# Patient Record
Sex: MALE | Race: WHITE | NOT HISPANIC OR LATINO | URBAN - NONMETROPOLITAN AREA
[De-identification: names, ages, dates, MRNs, and addresses within clinical notes are randomized per-mention and may not be internally consistent; named-entity substitution may affect disease eponyms.]

---

## 2019-08-27 ENCOUNTER — IMPORTED ENCOUNTER (OUTPATIENT)
Dept: URBAN - NONMETROPOLITAN AREA CLINIC 1 | Facility: CLINIC | Age: 54
End: 2019-08-27

## 2019-08-27 PROCEDURE — S0621 ROUTINE OPHTHALMOLOGICAL EXA: HCPCS

## 2019-08-27 NOTE — PATIENT DISCUSSION
IDDM x 9 yrs - no BDR. Stressed importance of good control. BF rx given. Prefers dvo.; 's Notes: Former  at [employer]. 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

## 2021-09-15 ENCOUNTER — IMPORTED ENCOUNTER (OUTPATIENT)
Dept: URBAN - NONMETROPOLITAN AREA CLINIC 1 | Facility: CLINIC | Age: 56
End: 2021-09-15

## 2021-09-15 PROCEDURE — 92310 CONTACT LENS FITTING OU: CPT

## 2021-09-15 PROCEDURE — 92014 COMPRE OPH EXAM EST PT 1/>: CPT

## 2021-09-15 PROCEDURE — 92015 DETERMINE REFRACTIVE STATE: CPT

## 2021-09-15 NOTE — PATIENT DISCUSSION
DM s -Stressed the importance of keeping blood sugars under control blood pressure under control and weight normalization and regular visits with PCP. -Explained the possible effects of poorly controlled diabetes and the damage that diabetes can cause to ocular health. -Patient to check HgbA1C.-Pt instructed to contact our office with any vision changes. Myopia-Discussed diagnosis with patient. -Explained that people who are myopic are at a higher risk for developing RD/RT and reviewed associated S&S.-Pt to contact our office if symptoms develop. Astigmatism-Discussed diagnosis with patient. Presbyopia-Discussed diagnosis with patient. Updated spec Rx given. Recommend lens that will provide comfort as well as protect safety and health of eyes. CL wear-CLs fit and center well.-Stressed that patient should not sleep in CL. -Updated CL Rx given. 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

## 2021-10-01 NOTE — PATIENT DISCUSSION
New Prescription: Maxitrol (neomycin-polymyxin-dexameth): drops,suspension: 3.5-10,000-0.1 mg/mL-unit/mL-% 1 drop twice a day as directed into left eye 10-

## 2021-10-01 NOTE — PATIENT DISCUSSION
- Benign appearance. Has symptoms reliably from approximately 9:00 every night to the early morning. Resolves shortly after waking. Unclear cause.

## 2022-04-15 ASSESSMENT — VISUAL ACUITY
OU_CC: 20/50
OS_CC: 20/20
OU_CC: 20/20
OS_SC: 20/20
OD_SC: 20/20
OD_CC: 20/50-1
OD_SC: 20/20
OS_SC: 20/20
OS_CC: 20/200
OD_CC: 20/20
OD_CC: 20/80-1
OS_CC: 20/50
OU_SC: 20/20

## 2022-04-15 ASSESSMENT — TONOMETRY
OS_IOP_MMHG: 15
OD_IOP_MMHG: 15
OD_IOP_MMHG: 15
OS_IOP_MMHG: 15

## 2022-10-17 ENCOUNTER — ESTABLISHED PATIENT (OUTPATIENT)
Dept: RURAL CLINIC 1 | Facility: CLINIC | Age: 57
End: 2022-10-17

## 2022-10-17 DIAGNOSIS — E11.9: ICD-10-CM

## 2022-10-17 DIAGNOSIS — H35.413: ICD-10-CM

## 2022-10-17 DIAGNOSIS — H52.13: ICD-10-CM

## 2022-10-17 PROCEDURE — 92014 COMPRE OPH EXAM EST PT 1/>: CPT

## 2022-10-17 PROCEDURE — 92015 DETERMINE REFRACTIVE STATE: CPT

## 2022-10-17 ASSESSMENT — VISUAL ACUITY
OD_CC: 20/20
OU_SC: 20/25
OS_CC: 20/20
OU_CC: 20/20-1

## 2022-10-17 ASSESSMENT — TONOMETRY
OD_IOP_MMHG: 17
OS_IOP_MMHG: 15

## 2023-10-18 ENCOUNTER — ESTABLISHED PATIENT (OUTPATIENT)
Dept: RURAL CLINIC 1 | Facility: CLINIC | Age: 58
End: 2023-10-18

## 2023-10-18 DIAGNOSIS — H52.223: ICD-10-CM

## 2023-10-18 DIAGNOSIS — H35.413: ICD-10-CM

## 2023-10-18 DIAGNOSIS — H52.13: ICD-10-CM

## 2023-10-18 DIAGNOSIS — E11.9: ICD-10-CM

## 2023-10-18 DIAGNOSIS — H52.4: ICD-10-CM

## 2023-10-18 PROCEDURE — 92015 DETERMINE REFRACTIVE STATE: CPT

## 2023-10-18 PROCEDURE — 92014 COMPRE OPH EXAM EST PT 1/>: CPT

## 2023-10-18 ASSESSMENT — VISUAL ACUITY
OS_CC: 20/25+1
OU_CC: 20/20
OD_CC: 20/20
OS_SC: 20/25
OD_SC: 20/25

## 2023-10-18 ASSESSMENT — TONOMETRY
OD_IOP_MMHG: 15
OS_IOP_MMHG: 15

## 2024-10-21 ENCOUNTER — COMPREHENSIVE EXAM (OUTPATIENT)
Dept: RURAL CLINIC 1 | Facility: CLINIC | Age: 59
End: 2024-10-21

## 2024-10-21 DIAGNOSIS — H52.223: ICD-10-CM

## 2024-10-21 DIAGNOSIS — E11.9: ICD-10-CM

## 2024-10-21 DIAGNOSIS — H52.4: ICD-10-CM

## 2024-10-21 DIAGNOSIS — H35.413: ICD-10-CM

## 2024-10-21 DIAGNOSIS — H52.13: ICD-10-CM

## 2024-10-21 PROCEDURE — 92015 DETERMINE REFRACTIVE STATE: CPT

## 2024-10-21 PROCEDURE — 92014 COMPRE OPH EXAM EST PT 1/>: CPT

## 2024-10-21 PROCEDURE — 92310-1 LEVEL 1 SOFT LENS UPDATE
